# Patient Record
Sex: FEMALE | Race: OTHER | NOT HISPANIC OR LATINO | URBAN - METROPOLITAN AREA
[De-identification: names, ages, dates, MRNs, and addresses within clinical notes are randomized per-mention and may not be internally consistent; named-entity substitution may affect disease eponyms.]

---

## 2022-01-06 VITALS
SYSTOLIC BLOOD PRESSURE: 114 MMHG | HEART RATE: 58 BPM | OXYGEN SATURATION: 98 % | DIASTOLIC BLOOD PRESSURE: 60 MMHG | TEMPERATURE: 97 F

## 2022-01-06 RX ORDER — CHLORHEXIDINE GLUCONATE 213 G/1000ML
1 SOLUTION TOPICAL ONCE
Refills: 0 | Status: DISCONTINUED | OUTPATIENT
Start: 2022-01-10 | End: 2022-01-24

## 2022-01-06 NOTE — H&P ADULT - ASSESSMENT
65 y/o F, with PMHx of HTN, HLD, DM, hyperthyroidism, fatty liver, questionable lung mass? (currently awaiting authorization for CT scan of lungs), who presents for cardiac cath due to patient's risk factors, CCS Angina Class IV Symptoms/CCS Angina Class III Equivalent Symptoms in the setting of an abnormal CTA Coronaries.    ASA III                Mallampati    Risks & benefits of procedure and alternative therapy have been explained to the patient including but not limited to: allergic reaction, bleeding w/possible need for blood transfusion, infection, renal and vascular compromise, limb damage, arrhythmia, stroke, vessel dissection/perforation, Myocardial infarction, emergent CABG. Informed consent obtained and in chart.       Hgb/HCT . Patient denies bleeding, BRBPR, melena, hematuria, hematemesis.  67 y/o F, with PMHx of HTN, HLD, DM, hyperthyroidism, fatty liver, questionable lung mass? (currently awaiting authorization for CT scan of lungs), who presents for cardiac cath due to patient's risk factors, CCS Angina Class IV Symptoms/CCS Angina Class III Equivalent Symptoms in the setting of an abnormal CTA Coronaries.    ASA III                Mallampati    Risks & benefits of procedure and alternative therapy have been explained to the patient including but not limited to: allergic reaction, bleeding w/possible need for blood transfusion, infection, renal and vascular compromise, limb damage, arrhythmia, stroke, vessel dissection/perforation, Myocardial infarction, emergent CABG. Informed consent obtained and in chart.       Hgb/HCT . Patient denies bleeding, BRBPR, melena, hematuria, hematemesis. Given oLM 50% stenosis on CTA will not load patient with antiplatelets at this time as patient will likely require surgery if LM stenosis is  65 y/o F, with PMHx of HTN, HLD, DM, hyperthyroidism, fatty liver, questionable lung mass? (currently awaiting authorization for CT scan of lungs), who presents for cardiac cath due to patient's risk factors, CCS Angina Class IV Symptoms/CCS Angina Class III Equivalent Symptoms in the setting of an abnormal CTA Coronaries.    ASA III                Mallampati    Risks & benefits of procedure and alternative therapy have been explained to the patient including but not limited to: allergic reaction, bleeding w/possible need for blood transfusion, infection, renal and vascular compromise, limb damage, arrhythmia, stroke, vessel dissection/perforation, Myocardial infarction, emergent CABG. Informed consent obtained and in chart.       Hgb/HCT . Patient denies bleeding, BRBPR, melena, hematuria, hematemesis. Given oLM 50% stenosis on CTA will not load patient with antiplatelets at this time as patient will likely require surgery if LM stenosis is significant by IVUS or FFR. 67 y/o F, with PMHx of HTN, HLD, DM Type II (Hgb A1C 6.1% on 4/21/2021), Hyperthyroidism, fatty liver, questionable lung mass? (currently awaiting authorization for CT scan of lungs), who presents for cardiac cath due to patient's risk factors, CCS Angina Class IV Symptoms/CCS Angina Class III Equivalent Symptoms in the setting of an abnormal CTA Coronaries.    ASA III                Mallampati    Risks & benefits of procedure and alternative therapy have been explained to the patient including but not limited to: allergic reaction, bleeding w/possible need for blood transfusion, infection, renal and vascular compromise, limb damage, arrhythmia, stroke, vessel dissection/perforation, Myocardial infarction, emergent CABG. Informed consent obtained and in chart.       Hgb/HCT . Patient denies bleeding, BRBPR, melena, hematuria, hematemesis. Given oLM 50% stenosis on CTA will not load patient with antiplatelets at this time as patient will likely require surgery if LM stenosis is significant by IVUS or FFR. 65 y/o F, with PMHx of HTN, HLD, DM Type II (Hgb A1C 6.1% on 4/21/2021- not currently on antihyperglycemics), Hyperthyroidism, fatty liver, questionable lung mass? (awaiting Pulmonary CT Scan on 1/12/2021), who presents for cardiac cath due to patient's risk factors, CCS Angina Class IV Symptoms/CCS Angina Class III Equivalent Symptoms in the setting of an abnormal CTA Coronaries.    ASA III                Mallampati III    Risks & benefits of procedure and alternative therapy have been explained to the patient including but not limited to: allergic reaction, bleeding w/possible need for blood transfusion, infection, renal and vascular compromise, limb damage, arrhythmia, stroke, vessel dissection/perforation, Myocardial infarction, emergent CABG. Informed consent obtained and in chart.       Hgb/HCT normal 13.6/41.1 . Patient denies bleeding, BRBPR, melena, hematuria, hematemesis. Given oLM 50% stenosis on CTA will not load patient with antiplatelets at this time as patient will likely require surgery if LM stenosis is significant by IVUS or FFR. Case discussed with Interventional Fellow Dr. Abdelrahman Hannah  67 y/o F, with PMHx of HTN, HLD, DM Type II (Hgb A1C 6.1% on 4/21/2021- not currently on antihyperglycemics), Hyperthyroidism, fatty liver, questionable lung mass? (awaiting Pulmonary CT Scan on 1/12/2021), who presents for cardiac cath due to patient's risk factors, CCS Angina Class IV Symptoms/CCS Angina Class III Equivalent Symptoms in the setting of an abnormal CTA Coronaries.    ASA III                Mallampati III    Risks & benefits of procedure and alternative therapy have been explained to the patient including but not limited to: allergic reaction, bleeding w/possible need for blood transfusion, infection, renal and vascular compromise, limb damage, arrhythmia, stroke, vessel dissection/perforation, Myocardial infarction, emergent CABG. Informed consent obtained and in chart.       Hgb/HCT normal 13.6/41.1 . Patient denies bleeding, BRBPR, melena, hematuria, hematemesis. Given oLM 50% stenosis on CTA will not load patient with antiplatelets at this time as patient will likely require surgery if LM stenosis is significant by IVUS or FFR. Case discussed with Interventional Fellow Dr. Abdelrahman Hannah.    Istat normal and acceptable for BMP.

## 2022-01-06 NOTE — H&P ADULT - HISTORY OF PRESENT ILLNESS
Cardiologist: Dr. Langston   Pharmacy:   Escort:   COVID: (   )  on           , results:    Pt is a 65 y/o F, with PMHx of HTN, HLD, DM, hyperthyroidism, fatty liver, who presented to his cardiologist, Dr. Langston, with complaints of 1 year history of intermittent, non-radiating, moderate left sided chest pain, described as a pressure that occurs both at rest and during exertion, and relieves spontaneously, after a few minutes. Patient denies diaphoresis, palpitations, dizziness, shortness of breath, syncope, PND, orthopnea, LE edema, recent travel, or sick contacts. ECHO 12/09/2021: Doppler evidence of Grade I (impaired) DD. No significant valvular abnormalities. EF: 55-60%. CCTA 12/21/2021: Calcium score is 243 Agatson units, which is at percentile 80, adjusted for age, gender, and race. 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 Remaining coronary artery segments are non-obstructive. Normal aortic dimensions. Normal LV function.     In light of patient's risk factors, CCS class III angina equivalent symptoms, and abnormal CCTA, patient t to undergo cardiac catheterization with possible intervention if clinically indicated.  [facility]    Cardiologist: Dr. Langston   Pharmacy:   Escort:   COVID: (   )  on           , results:    Pt is a 65 y/o F, with PMHx of HTN, HLD, DM, hyperthyroidism, fatty liver, who presented to his cardiologist, Dr. Langston, with complaints of 1 year history of intermittent, non-radiating, moderate left sided chest pain, described as a pressure that occurs both at rest and during exertion, and relieves spontaneously, after a few minutes. Patient denies diaphoresis, palpitations, dizziness, shortness of breath, syncope, PND, orthopnea, LE edema, recent travel, or sick contacts. ECHO 12/09/2021: Doppler evidence of Grade I (impaired) DD. No significant valvular abnormalities. EF: 55-60%. CCTA 12/21/2021: Calcium score is 243 Agatson units, which is at percentile 80, adjusted for age, gender, and race. 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 Remaining coronary artery segments are non-obstructive. Normal aortic dimensions. Normal LV function.     In light of patient's risk factors, CCS class III angina equivalent symptoms, and abnormal CCTA, patient t to undergo cardiac catheterization with possible intervention if clinically indicated.  Cardiologist: Dr. Langston   Pharmacy: [facility]   Escort: Sister   COVID: Jackeline 1/10/2022    Will bring in medications*    Pt is a 65 y/o F, with PMHx of HTN, HLD, DM, hyperthyroidism, fatty liver, questionable lung mass? (currently awaiting authorization for CT scan of lungs),  who presented to his cardiologist, Dr. Langston, with complaints of 1 year history of intermittent, non-radiating, moderate left sided chest pain, described as a pressure that occurs both at rest and during exertion, and relieves spontaneously, after a few minutes. Patient also states she has been having shortness of breath when she walks up stairs and when walking.  Patient denies diaphoresis, palpitations, dizziness, shortness of breath, syncope, PND, orthopnea, LE edema, recent travel, or sick contacts. ECHO 12/09/2021: Doppler evidence of Grade I (impaired) DD. No significant valvular abnormalities. EF: 55-60%. CCTA 12/21/2021: Calcium score is 243 Agatson units, which is at percentile 80, adjusted for age, gender, and race. 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 Remaining coronary artery segments are non-obstructive. Normal aortic dimensions. Normal LV function.     In light of patient's risk factors, CCS class III angina equivalent symptoms, and abnormal CCTA, patient t to undergo cardiac catheterization with possible intervention if clinically indicated.  Cardiologist: Dr. Langston   Pharmacy: [facility]   Escort: Sister   COVID: Jackeline 1/10/2022    Will bring in medications*    Pt is a 65 y/o F, with PMHx of HTN, HLD, DM, hyperthyroidism, fatty liver, questionable lung mass? (currently awaiting authorization for CT scan of lungs),  who presented to his cardiologist, Dr. Langston, with complaints of 1 year history of intermittent, non-radiating, moderate left sided chest pain, described as a pressure that occurs both at rest and during exertion, and relieves spontaneously, after a few minutes. Patient also states she has been having shortness of breath when she walks up stairs and when walking.  Patient denies diaphoresis, palpitations, dizziness, shortness of breath, syncope, PND, orthopnea, LE edema, recent travel, or sick contacts. ECHO 12/09/2021: Doppler evidence of Grade I (impaired) DD. No significant valvular abnormalities. EF: 55-60%. CCTA 12/21/2021: Calcium score is 243 Agatson units, which is at percentile 80, adjusted for age, gender, and race. 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 Remaining coronary artery segments are non-obstructive. Normal aortic dimensions. Normal LV function.     In light of patient's risk factors, CCS Angina Class IV Symptoms/ CCS Angina Class III Equivalent symptoms, and abnormal CCTA, patient t to undergo cardiac catheterization with possible intervention if clinically indicated.  Cardiologist: Dr. Langston   Pharmacy: [facility]   Escort: Sister   COVID: Jackeline 1/10/8390-[id number] in chart     Will bring in medications*    Pt is a 65 y/o F, with PMHx of HTN, HLD, DM Type II (Hgb A1C 6.1% on 4/21/2021), hyperthyroidism, fatty liver, questionable lung mass? (currently awaiting authorization for CT scan of lungs),  who presented to his cardiologist, Dr. Langston, with complaints of 1 year history of intermittent, non-radiating, moderate left sided chest pain, described as a pressure that occurs both at rest and during exertion, and relieves spontaneously, after a few minutes. Patient also states she has been having shortness of breath when she walks up stairs and when walking.  Patient denies diaphoresis, palpitations, dizziness, shortness of breath, syncope, PND, orthopnea, LE edema, recent travel, or sick contacts. ECHO 12/09/2021: Doppler evidence of Grade I (impaired) DD. No significant valvular abnormalities. EF: 55-60%. CCTA 12/21/2021: Calcium score is 243 Agatson units, which is at percentile 80, adjusted for age, gender, and race. 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 Remaining coronary artery segments are non-obstructive. Normal aortic dimensions. Normal LV function.     In light of patient's risk factors, CCS Angina Class IV Symptoms/ CCS Angina Class III Equivalent symptoms, and abnormal CCTA, patient t to undergo cardiac catheterization with possible intervention if clinically indicated.  Cardiologist: Dr. Langston   Pharmacy: [facility]   Escort: Sister   COVID: Jackeline 1/10/8028-[id number] in [facility]    Will bring in medications*    Pt is a 65 y/o F, with PMHx of HTN, HLD, DM Type II (Hgb A1C 6.1% on 4/21/2021), hyperthyroidism, fatty liver, questionable lung mass? (currently awaiting authorization for CT scan of lungs),  who presented to his cardiologist, Dr. Langston, with complaints of 1 year history of intermittent, non-radiating, moderate left sided chest pain, described as a pressure that occurs both at rest and during exertion, and relieves spontaneously, after a few minutes. Patient also states she has been having shortness of breath when she walks up stairs and when walking.  Patient denies diaphoresis, palpitations, dizziness, shortness of breath, syncope, PND, orthopnea, LE edema, recent travel, or sick contacts. ECHO 12/09/2021: Doppler evidence of Grade I (impaired) DD. No significant valvular abnormalities. EF: 55-60%. CCTA 12/21/2021: Calcium score is 243 Agatson units, which is at percentile 80, adjusted for age, gender, and race. 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 Remaining coronary artery segments are non-obstructive. Normal aortic dimensions. Normal LV function.     In light of patient's risk factors, CCS Angina Class IV Symptoms/ CCS Angina Class III Equivalent symptoms, and abnormal CCTA, patient t to undergo cardiac catheterization with possible intervention if clinically indicated.  Cardiologist: Dr. Langston   Pharmacy: [facility]   Escort: Sister   COVID: Jackeline 1/10/7346-[id number] in [facility]    Will bring in medications*     67 y/o Mandarin speaking F, with PMHx of HTN, HLD, DM Type II (Hgb A1C 6.1% on 4/21/2021- not currently on antihyperglycemics), hyperthyroidism, fatty liver, questionable lung mass? (currently awaiting authorization for CT scan of lungs),  who presented to his cardiologist, Dr. Langston, with complaints of 1 year history of intermittent, non-radiating, moderate left sided chest pain, described as a pressure that occurs both at rest and during exertion, and relieves spontaneously, after a few minutes. Patient also states she has been having shortness of breath when she walks up stairs and when walking.  Patient denies diaphoresis, palpitations, dizziness, shortness of breath, syncope, PND, orthopnea, LE edema, recent travel, or sick contacts. ECHO 12/09/2021: Doppler evidence of Grade I (impaired) DD. No significant valvular abnormalities. EF: 55-60%. CCTA 12/21/2021: Calcium score is 243 Agatson units, which is at percentile 80, adjusted for age, gender, and race. 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 Remaining coronary artery segments are non-obstructive. Normal aortic dimensions. Normal LV function.     In light of patient's risk factors, CCS Angina Class IV Symptoms/ CCS Angina Class III Equivalent symptoms, and abnormal CCTA, patient t to undergo cardiac catheterization with possible intervention if clinically indicated.  Cardiologist: Dr. Langston   Pharmacy: [facility]   Escort: Sister   COVID: Jackeline 1/10/5026-[id number] in [facility]    Will bring in medications*  67 y/o F, with PMHx of HTN, HLD, DM Type II (Hgb A1C 6.1% on 4/21/2021- not currently on antihyperglycemics), Hyperthyroidism, fatty liver, questionable lung mass? (awaiting Pulmonary CT Scan on 1/12/2021),  who presented to his cardiologist, Dr. Langston, with complaints of 1 year history of intermittent, non-radiating, moderate left sided chest pain, described as a pressure that occurs both at rest and during exertion, and relieves spontaneously, after a few minutes. Patient also states she has been having shortness of breath when she walks up stairs and when walking.  Patient denies diaphoresis, palpitations, dizziness, shortness of breath, syncope, PND, orthopnea, LE edema, recent travel, or sick contacts. ECHO 12/09/2021: Doppler evidence of Grade I (impaired) DD. No significant valvular abnormalities. EF: 55-60%. CCTA 12/21/2021: Calcium score is 243 Agatson units, which is at percentile 80, adjusted for age, gender, and race. 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 Remaining coronary artery segments are non-obstructive. Normal aortic dimensions. Normal LV function.     In light of patient's risk factors, CCS Angina Class IV Symptoms/ CCS Angina Class III Equivalent symptoms, and abnormal CCTA, patient t to undergo cardiac catheterization with possible intervention if clinically indicated.

## 2022-01-06 NOTE — H&P ADULT - NSHPLABSRESULTS_GEN_ALL_CORE
13.6   4.94  )-----------( 240      ( 10 Davy 2022 09:50 )             41.1               PT/INR - ( 10 Davy 2022 09:51 )   PT: 11.6 sec;   INR: 0.97          PTT - ( 10 Davy 2022 09:51 )  PTT:39.3 sec              EKG: SB at 54 bpm. NO ST changes. Low Voltage QRS III, AVF. Q wave III

## 2022-01-10 ENCOUNTER — OUTPATIENT (OUTPATIENT)
Dept: OUTPATIENT SERVICES | Facility: HOSPITAL | Age: 67
LOS: 1 days | Discharge: HOME | End: 2022-01-10
Payer: MEDICARE

## 2022-01-10 LAB
A1C WITH ESTIMATED AVERAGE GLUCOSE RESULT: 6.3 % — HIGH (ref 4–5.6)
ALBUMIN SERPL ELPH-MCNC: 5 G/DL — SIGNIFICANT CHANGE UP (ref 3.3–5)
ALP SERPL-CCNC: 72 U/L — SIGNIFICANT CHANGE UP (ref 40–120)
ALT FLD-CCNC: 66 U/L — HIGH (ref 10–45)
ANION GAP SERPL CALC-SCNC: 11 MMOL/L — SIGNIFICANT CHANGE UP (ref 5–17)
ANION GAP SERPL CALC-SCNC: 8 MMOL/L — SIGNIFICANT CHANGE UP (ref 5–17)
APTT BLD: 39.3 SEC — HIGH (ref 27.5–35.5)
AST SERPL-CCNC: 51 U/L — HIGH (ref 10–40)
BASOPHILS # BLD AUTO: 0.04 K/UL — SIGNIFICANT CHANGE UP (ref 0–0.2)
BASOPHILS NFR BLD AUTO: 0.8 % — SIGNIFICANT CHANGE UP (ref 0–2)
BILIRUB SERPL-MCNC: 0.6 MG/DL — SIGNIFICANT CHANGE UP (ref 0.2–1.2)
BUN SERPL-MCNC: 17 MG/DL — SIGNIFICANT CHANGE UP (ref 7–23)
BUN SERPL-MCNC: 21 MG/DL — SIGNIFICANT CHANGE UP (ref 7–23)
CALCIUM SERPL-MCNC: 8.3 MG/DL — LOW (ref 8.4–10.5)
CALCIUM SERPL-MCNC: 9.5 MG/DL — SIGNIFICANT CHANGE UP (ref 8.4–10.5)
CHLORIDE SERPL-SCNC: 102 MMOL/L — SIGNIFICANT CHANGE UP (ref 96–108)
CHLORIDE SERPL-SCNC: 105 MMOL/L — SIGNIFICANT CHANGE UP (ref 96–108)
CHOLEST SERPL-MCNC: 167 MG/DL — SIGNIFICANT CHANGE UP
CK MB CFR SERPL CALC: 1.3 NG/ML — SIGNIFICANT CHANGE UP (ref 0–6.7)
CK SERPL-CCNC: 147 U/L — SIGNIFICANT CHANGE UP (ref 25–170)
CO2 SERPL-SCNC: 26 MMOL/L — SIGNIFICANT CHANGE UP (ref 22–31)
CO2 SERPL-SCNC: 26 MMOL/L — SIGNIFICANT CHANGE UP (ref 22–31)
CREAT SERPL-MCNC: 0.84 MG/DL — SIGNIFICANT CHANGE UP (ref 0.5–1.3)
CREAT SERPL-MCNC: 0.93 MG/DL — SIGNIFICANT CHANGE UP (ref 0.5–1.3)
EOSINOPHIL # BLD AUTO: 0.09 K/UL — SIGNIFICANT CHANGE UP (ref 0–0.5)
EOSINOPHIL NFR BLD AUTO: 1.8 % — SIGNIFICANT CHANGE UP (ref 0–6)
ESTIMATED AVERAGE GLUCOSE: 134 MG/DL — HIGH (ref 68–114)
GLUCOSE BLDC GLUCOMTR-MCNC: 115 MG/DL — HIGH (ref 70–99)
GLUCOSE SERPL-MCNC: 120 MG/DL — HIGH (ref 70–99)
GLUCOSE SERPL-MCNC: 152 MG/DL — HIGH (ref 70–99)
HCT VFR BLD CALC: 35.3 % — SIGNIFICANT CHANGE UP (ref 34.5–45)
HCT VFR BLD CALC: 41.1 % — SIGNIFICANT CHANGE UP (ref 34.5–45)
HCV AB S/CO SERPL IA: 0.04 S/CO — SIGNIFICANT CHANGE UP
HCV AB SERPL-IMP: SIGNIFICANT CHANGE UP
HDLC SERPL-MCNC: 51 MG/DL — SIGNIFICANT CHANGE UP
HGB BLD-MCNC: 11.7 G/DL — SIGNIFICANT CHANGE UP (ref 11.5–15.5)
HGB BLD-MCNC: 13.6 G/DL — SIGNIFICANT CHANGE UP (ref 11.5–15.5)
IMM GRANULOCYTES NFR BLD AUTO: 0.2 % — SIGNIFICANT CHANGE UP (ref 0–1.5)
INR BLD: 0.97 — SIGNIFICANT CHANGE UP (ref 0.88–1.16)
LIPID PNL WITH DIRECT LDL SERPL: 86 MG/DL — SIGNIFICANT CHANGE UP
LYMPHOCYTES # BLD AUTO: 1.67 K/UL — SIGNIFICANT CHANGE UP (ref 1–3.3)
LYMPHOCYTES # BLD AUTO: 33.8 % — SIGNIFICANT CHANGE UP (ref 13–44)
MAGNESIUM SERPL-MCNC: 2 MG/DL — SIGNIFICANT CHANGE UP (ref 1.6–2.6)
MCHC RBC-ENTMCNC: 29.5 PG — SIGNIFICANT CHANGE UP (ref 27–34)
MCHC RBC-ENTMCNC: 29.8 PG — SIGNIFICANT CHANGE UP (ref 27–34)
MCHC RBC-ENTMCNC: 33.1 GM/DL — SIGNIFICANT CHANGE UP (ref 32–36)
MCHC RBC-ENTMCNC: 33.1 GM/DL — SIGNIFICANT CHANGE UP (ref 32–36)
MCV RBC AUTO: 89.1 FL — SIGNIFICANT CHANGE UP (ref 80–100)
MCV RBC AUTO: 90.1 FL — SIGNIFICANT CHANGE UP (ref 80–100)
MONOCYTES # BLD AUTO: 0.33 K/UL — SIGNIFICANT CHANGE UP (ref 0–0.9)
MONOCYTES NFR BLD AUTO: 6.7 % — SIGNIFICANT CHANGE UP (ref 2–14)
NEUTROPHILS # BLD AUTO: 2.8 K/UL — SIGNIFICANT CHANGE UP (ref 1.8–7.4)
NEUTROPHILS NFR BLD AUTO: 56.7 % — SIGNIFICANT CHANGE UP (ref 43–77)
NON HDL CHOLESTEROL: 116 MG/DL — SIGNIFICANT CHANGE UP
NRBC # BLD: 0 /100 WBCS — SIGNIFICANT CHANGE UP (ref 0–0)
NRBC # BLD: 0 /100 WBCS — SIGNIFICANT CHANGE UP (ref 0–0)
PLATELET # BLD AUTO: 214 K/UL — SIGNIFICANT CHANGE UP (ref 150–400)
PLATELET # BLD AUTO: 240 K/UL — SIGNIFICANT CHANGE UP (ref 150–400)
POTASSIUM SERPL-MCNC: 4 MMOL/L — SIGNIFICANT CHANGE UP (ref 3.5–5.3)
POTASSIUM SERPL-MCNC: 4.3 MMOL/L — SIGNIFICANT CHANGE UP (ref 3.5–5.3)
POTASSIUM SERPL-SCNC: 4 MMOL/L — SIGNIFICANT CHANGE UP (ref 3.5–5.3)
POTASSIUM SERPL-SCNC: 4.3 MMOL/L — SIGNIFICANT CHANGE UP (ref 3.5–5.3)
PROT SERPL-MCNC: 8.3 G/DL — SIGNIFICANT CHANGE UP (ref 6–8.3)
PROTHROM AB SERPL-ACNC: 11.6 SEC — SIGNIFICANT CHANGE UP (ref 10.6–13.6)
RBC # BLD: 3.96 M/UL — SIGNIFICANT CHANGE UP (ref 3.8–5.2)
RBC # BLD: 4.56 M/UL — SIGNIFICANT CHANGE UP (ref 3.8–5.2)
RBC # FLD: 12.8 % — SIGNIFICANT CHANGE UP (ref 10.3–14.5)
RBC # FLD: 12.9 % — SIGNIFICANT CHANGE UP (ref 10.3–14.5)
SODIUM SERPL-SCNC: 139 MMOL/L — SIGNIFICANT CHANGE UP (ref 135–145)
SODIUM SERPL-SCNC: 139 MMOL/L — SIGNIFICANT CHANGE UP (ref 135–145)
TRIGL SERPL-MCNC: 152 MG/DL — HIGH
WBC # BLD: 4.94 K/UL — SIGNIFICANT CHANGE UP (ref 3.8–10.5)
WBC # BLD: 5.58 K/UL — SIGNIFICANT CHANGE UP (ref 3.8–10.5)
WBC # FLD AUTO: 4.94 K/UL — SIGNIFICANT CHANGE UP (ref 3.8–10.5)
WBC # FLD AUTO: 5.58 K/UL — SIGNIFICANT CHANGE UP (ref 3.8–10.5)

## 2022-01-10 PROCEDURE — 92978 ENDOLUMINL IVUS OCT C 1ST: CPT | Mod: LM

## 2022-01-10 PROCEDURE — 85610 PROTHROMBIN TIME: CPT

## 2022-01-10 PROCEDURE — 83735 ASSAY OF MAGNESIUM: CPT

## 2022-01-10 PROCEDURE — C1753: CPT

## 2022-01-10 PROCEDURE — C1725: CPT

## 2022-01-10 PROCEDURE — C1887: CPT

## 2022-01-10 PROCEDURE — 82553 CREATINE MB FRACTION: CPT

## 2022-01-10 PROCEDURE — 80053 COMPREHEN METABOLIC PANEL: CPT

## 2022-01-10 PROCEDURE — C1769: CPT

## 2022-01-10 PROCEDURE — 85027 COMPLETE CBC AUTOMATED: CPT

## 2022-01-10 PROCEDURE — 86803 HEPATITIS C AB TEST: CPT

## 2022-01-10 PROCEDURE — 93010 ELECTROCARDIOGRAM REPORT: CPT

## 2022-01-10 PROCEDURE — 93571 IV DOP VEL&/PRESS C FLO 1ST: CPT | Mod: LD,52

## 2022-01-10 PROCEDURE — 99152 MOD SED SAME PHYS/QHP 5/>YRS: CPT

## 2022-01-10 PROCEDURE — 80048 BASIC METABOLIC PNL TOTAL CA: CPT

## 2022-01-10 PROCEDURE — C9600: CPT | Mod: LM

## 2022-01-10 PROCEDURE — 85730 THROMBOPLASTIN TIME PARTIAL: CPT

## 2022-01-10 PROCEDURE — 99153 MOD SED SAME PHYS/QHP EA: CPT

## 2022-01-10 PROCEDURE — 82550 ASSAY OF CK (CPK): CPT

## 2022-01-10 PROCEDURE — 80061 LIPID PANEL: CPT

## 2022-01-10 PROCEDURE — 82962 GLUCOSE BLOOD TEST: CPT

## 2022-01-10 PROCEDURE — 85025 COMPLETE CBC W/AUTO DIFF WBC: CPT

## 2022-01-10 PROCEDURE — 93005 ELECTROCARDIOGRAM TRACING: CPT

## 2022-01-10 PROCEDURE — 36415 COLL VENOUS BLD VENIPUNCTURE: CPT

## 2022-01-10 PROCEDURE — C1894: CPT

## 2022-01-10 PROCEDURE — C1874: CPT

## 2022-01-10 PROCEDURE — 83036 HEMOGLOBIN GLYCOSYLATED A1C: CPT

## 2022-01-10 PROCEDURE — 93454 CORONARY ARTERY ANGIO S&I: CPT | Mod: 59

## 2022-01-10 RX ORDER — AMLODIPINE BESYLATE 2.5 MG/1
1 TABLET ORAL
Qty: 0 | Refills: 0 | DISCHARGE

## 2022-01-10 RX ORDER — SODIUM CHLORIDE 9 MG/ML
1000 INJECTION, SOLUTION INTRAVENOUS
Refills: 0 | Status: DISCONTINUED | OUTPATIENT
Start: 2022-01-10 | End: 2022-01-24

## 2022-01-10 RX ORDER — DEXTROSE 50 % IN WATER 50 %
15 SYRINGE (ML) INTRAVENOUS ONCE
Refills: 0 | Status: DISCONTINUED | OUTPATIENT
Start: 2022-01-10 | End: 2022-01-24

## 2022-01-10 RX ORDER — SIMVASTATIN 20 MG/1
1 TABLET, FILM COATED ORAL
Qty: 0 | Refills: 0 | DISCHARGE

## 2022-01-10 RX ORDER — DEXTROSE 50 % IN WATER 50 %
25 SYRINGE (ML) INTRAVENOUS ONCE
Refills: 0 | Status: DISCONTINUED | OUTPATIENT
Start: 2022-01-10 | End: 2022-01-24

## 2022-01-10 RX ORDER — GLUCAGON INJECTION, SOLUTION 0.5 MG/.1ML
1 INJECTION, SOLUTION SUBCUTANEOUS ONCE
Refills: 0 | Status: DISCONTINUED | OUTPATIENT
Start: 2022-01-10 | End: 2022-01-24

## 2022-01-10 RX ORDER — SODIUM CHLORIDE 9 MG/ML
250 INJECTION INTRAMUSCULAR; INTRAVENOUS; SUBCUTANEOUS ONCE
Refills: 0 | Status: DISCONTINUED | OUTPATIENT
Start: 2022-01-10 | End: 2022-01-24

## 2022-01-10 RX ORDER — ATORVASTATIN CALCIUM 80 MG/1
1 TABLET, FILM COATED ORAL
Qty: 0 | Refills: 0 | DISCHARGE

## 2022-01-10 RX ORDER — DEXTROSE 50 % IN WATER 50 %
12.5 SYRINGE (ML) INTRAVENOUS ONCE
Refills: 0 | Status: DISCONTINUED | OUTPATIENT
Start: 2022-01-10 | End: 2022-01-24

## 2022-01-10 RX ORDER — CLOPIDOGREL BISULFATE 75 MG/1
1 TABLET, FILM COATED ORAL
Qty: 90 | Refills: 3
Start: 2022-01-10 | End: 2023-01-04

## 2022-01-10 RX ORDER — ICOSAPENT ETHYL 500 MG/1
2 CAPSULE, LIQUID FILLED ORAL
Qty: 0 | Refills: 0 | DISCHARGE

## 2022-01-10 RX ORDER — INSULIN LISPRO 100/ML
VIAL (ML) SUBCUTANEOUS ONCE
Refills: 0 | Status: DISCONTINUED | OUTPATIENT
Start: 2022-01-10 | End: 2022-01-24

## 2022-01-10 RX ORDER — ESOMEPRAZOLE MAGNESIUM 40 MG/1
1 CAPSULE, DELAYED RELEASE ORAL
Qty: 0 | Refills: 0 | DISCHARGE

## 2022-01-10 RX ORDER — ASPIRIN/CALCIUM CARB/MAGNESIUM 324 MG
1 TABLET ORAL
Qty: 90 | Refills: 3
Start: 2022-01-10 | End: 2023-01-04

## 2022-01-10 RX ORDER — SODIUM CHLORIDE 9 MG/ML
500 INJECTION INTRAMUSCULAR; INTRAVENOUS; SUBCUTANEOUS
Refills: 0 | Status: DISCONTINUED | OUTPATIENT
Start: 2022-01-10 | End: 2022-01-24

## 2022-01-10 RX ORDER — ASPIRIN/CALCIUM CARB/MAGNESIUM 324 MG
1 TABLET ORAL
Qty: 0 | Refills: 0 | DISCHARGE

## 2022-01-10 RX ORDER — LOSARTAN POTASSIUM 100 MG/1
1 TABLET, FILM COATED ORAL
Qty: 0 | Refills: 0 | DISCHARGE

## 2022-01-10 NOTE — PROGRESS NOTE ADULT - SUBJECTIVE AND OBJECTIVE BOX
Interventional Cardiology PA SDA Discharge Note    Patient without complaints.     Afebrile, VSS    Ext:    		  		        Right     Radial : no   hematoma,    no bleeding, dressing; C/D/I      Pulses:    intact RAD to baseline     A/P:      67 y/o F, with PMHx of HTN, HLD, DM Type II (Hgb A1C 6.1% on 4/21/2021- not currently on antihyperglycemics), Hyperthyroidism, fatty liver, questionable lung mass? (awaiting Pulmonary CT Scan on 1/12/2021), who presents for cardiac cath due to patient's risk factors, CCS Angina Class IV Symptoms/CCS Angina Class III Equivalent Symptoms in the setting of an abnormal CTA Coronaries.     Pt. s/p cardiac cath 1/10/21: JOSE ALFREDO oLM 60-70 % (+ IVUS), pLAD 50 % (- IFR), EDP 8, Ef normal, R rad access. Home meds reviewed with Dr. Langston and patient to be d/c on ASA 81 mg daily, Plavix 75 mg daily; Norvasc 10 mg daily; losartan 50 mg daily; Vascepa 2 g daily and Lipitor 80 m daily.     Prescription for ASA/Plavix 90 day supply with 3 refills sent to [facility] as per family's request. Pt. on Lipitor 40 mg daily (instructed to take 2 tablets at night for now and Dr. Langston will send prescription       1.	Stable for discharge as per attending Dr. ____Kian_____ after bed rest, pt voids, post labs/EKG stable,  wrist stable and 30 minutes of ambulation.  2.	Follow-up with PMD/Cardiologist ___Dr. Langston________ in 3 days  3.	Discharged forms signed and copies in chart   4.          Patient has been given appropriate discharge instructions including medication regimen, access site management and follow up. Prescriptions have been e-prescribed to patient's preferred pharmacy

## 2022-01-18 PROBLEM — E11.9 TYPE 2 DIABETES MELLITUS WITHOUT COMPLICATIONS: Chronic | Status: ACTIVE | Noted: 2022-01-06

## 2022-01-18 PROBLEM — I10 ESSENTIAL (PRIMARY) HYPERTENSION: Chronic | Status: ACTIVE | Noted: 2022-01-06

## 2022-01-18 PROBLEM — E78.5 HYPERLIPIDEMIA, UNSPECIFIED: Chronic | Status: ACTIVE | Noted: 2022-01-06

## 2022-01-19 ENCOUNTER — APPOINTMENT (OUTPATIENT)
Dept: HEART AND VASCULAR | Facility: CLINIC | Age: 67
End: 2022-01-19
Payer: MEDICARE

## 2022-01-19 DIAGNOSIS — E05.90 THYROTOXICOSIS, UNSPECIFIED W/OUT THYROTOXIC CRISIS OR STORM: ICD-10-CM

## 2022-01-19 DIAGNOSIS — I25.10 ATHEROSCLEROTIC HEART DISEASE OF NATIVE CORONARY ARTERY W/OUT ANGINA PECTORIS: ICD-10-CM

## 2022-01-19 DIAGNOSIS — E78.5 HYPERLIPIDEMIA, UNSPECIFIED: ICD-10-CM

## 2022-01-19 DIAGNOSIS — I10 ESSENTIAL (PRIMARY) HYPERTENSION: ICD-10-CM

## 2022-01-19 DIAGNOSIS — Z98.61 ATHEROSCLEROTIC HEART DISEASE OF NATIVE CORONARY ARTERY W/OUT ANGINA PECTORIS: ICD-10-CM

## 2022-01-19 DIAGNOSIS — K76.0 FATTY (CHANGE OF) LIVER, NOT ELSEWHERE CLASSIFIED: ICD-10-CM

## 2022-01-19 DIAGNOSIS — E11.9 TYPE 2 DIABETES MELLITUS W/OUT COMPLICATIONS: ICD-10-CM

## 2022-01-19 PROBLEM — Z00.00 ENCOUNTER FOR PREVENTIVE HEALTH EXAMINATION: Status: ACTIVE | Noted: 2022-01-19

## 2022-01-19 PROCEDURE — 99443: CPT

## 2022-01-20 PROBLEM — I10 HTN (HYPERTENSION): Status: ACTIVE | Noted: 2022-01-20

## 2022-01-20 PROBLEM — K76.0 FATTY LIVER: Status: ACTIVE | Noted: 2022-01-20

## 2022-01-20 PROBLEM — E05.90 HYPERTHYROIDISM: Status: ACTIVE | Noted: 2022-01-20

## 2022-01-20 PROBLEM — E11.9 DIABETES MELLITUS TYPE 2, CONTROLLED: Status: ACTIVE | Noted: 2022-01-20

## 2022-01-20 PROBLEM — I25.10 CAD S/P PERCUTANEOUS CORONARY ANGIOPLASTY: Status: ACTIVE | Noted: 2022-01-20

## 2022-01-20 PROBLEM — E78.5 HLD (HYPERLIPIDEMIA): Status: ACTIVE | Noted: 2022-01-20

## 2022-01-21 DIAGNOSIS — I25.118 ATHEROSCLEROTIC HEART DISEASE OF NATIVE CORONARY ARTERY WITH OTHER FORMS OF ANGINA PECTORIS: ICD-10-CM

## 2022-01-21 DIAGNOSIS — R94.39 ABNORMAL RESULT OF OTHER CARDIOVASCULAR FUNCTION STUDY: ICD-10-CM
